# Patient Record
Sex: MALE | Race: BLACK OR AFRICAN AMERICAN | NOT HISPANIC OR LATINO
[De-identification: names, ages, dates, MRNs, and addresses within clinical notes are randomized per-mention and may not be internally consistent; named-entity substitution may affect disease eponyms.]

---

## 2018-01-17 PROBLEM — Z00.00 ENCOUNTER FOR PREVENTIVE HEALTH EXAMINATION: Status: ACTIVE | Noted: 2018-01-17

## 2018-02-27 ENCOUNTER — APPOINTMENT (OUTPATIENT)
Dept: SURGERY | Facility: CLINIC | Age: 58
End: 2018-02-27
Payer: MEDICARE

## 2018-02-27 VITALS
HEIGHT: 70 IN | HEART RATE: 78 BPM | SYSTOLIC BLOOD PRESSURE: 130 MMHG | WEIGHT: 140 LBS | DIASTOLIC BLOOD PRESSURE: 73 MMHG | BODY MASS INDEX: 20.04 KG/M2

## 2018-02-27 PROCEDURE — 99204 OFFICE O/P NEW MOD 45 MIN: CPT

## 2018-02-27 RX ORDER — ASPIRIN 81 MG
81 TABLET, DELAYED RELEASE (ENTERIC COATED) ORAL
Refills: 0 | Status: ACTIVE | COMMUNITY

## 2018-02-27 RX ORDER — LOSARTAN POTASSIUM 100 MG/1
100 TABLET, FILM COATED ORAL
Refills: 0 | Status: ACTIVE | COMMUNITY

## 2018-02-27 RX ORDER — GABAPENTIN 400 MG/1
CAPSULE ORAL
Refills: 0 | Status: ACTIVE | COMMUNITY

## 2018-02-27 RX ORDER — METOPROLOL SUCCINATE 50 MG/1
50 TABLET, EXTENDED RELEASE ORAL
Refills: 0 | Status: ACTIVE | COMMUNITY

## 2018-03-12 ENCOUNTER — FORM ENCOUNTER (OUTPATIENT)
Age: 58
End: 2018-03-12

## 2018-03-13 ENCOUNTER — OUTPATIENT (OUTPATIENT)
Dept: OUTPATIENT SERVICES | Facility: HOSPITAL | Age: 58
LOS: 1 days | End: 2018-03-13
Payer: MEDICAID

## 2018-03-13 ENCOUNTER — APPOINTMENT (OUTPATIENT)
Dept: ULTRASOUND IMAGING | Facility: CLINIC | Age: 58
End: 2018-03-13
Payer: MEDICARE

## 2018-03-13 ENCOUNTER — APPOINTMENT (OUTPATIENT)
Dept: CT IMAGING | Facility: CLINIC | Age: 58
End: 2018-03-13
Payer: MEDICARE

## 2018-03-13 DIAGNOSIS — Z00.8 ENCOUNTER FOR OTHER GENERAL EXAMINATION: ICD-10-CM

## 2018-03-13 PROCEDURE — 70492 CT SFT TSUE NCK W/O & W/DYE: CPT

## 2018-03-13 PROCEDURE — 82565 ASSAY OF CREATININE: CPT

## 2018-03-13 PROCEDURE — 76536 US EXAM OF HEAD AND NECK: CPT

## 2018-03-13 PROCEDURE — 70491 CT SOFT TISSUE NECK W/DYE: CPT | Mod: 26

## 2018-03-13 PROCEDURE — 76536 US EXAM OF HEAD AND NECK: CPT | Mod: 26

## 2018-04-30 ENCOUNTER — OUTPATIENT (OUTPATIENT)
Dept: OUTPATIENT SERVICES | Facility: HOSPITAL | Age: 58
LOS: 1 days | End: 2018-04-30
Payer: MEDICAID

## 2018-04-30 VITALS
HEIGHT: 70 IN | RESPIRATION RATE: 16 BRPM | OXYGEN SATURATION: 98 % | SYSTOLIC BLOOD PRESSURE: 140 MMHG | DIASTOLIC BLOOD PRESSURE: 78 MMHG | WEIGHT: 136.91 LBS | TEMPERATURE: 97 F | HEART RATE: 92 BPM

## 2018-04-30 DIAGNOSIS — Z21 ASYMPTOMATIC HUMAN IMMUNODEFICIENCY VIRUS [HIV] INFECTION STATUS: ICD-10-CM

## 2018-04-30 DIAGNOSIS — Z98.890 OTHER SPECIFIED POSTPROCEDURAL STATES: Chronic | ICD-10-CM

## 2018-04-30 DIAGNOSIS — I10 ESSENTIAL (PRIMARY) HYPERTENSION: ICD-10-CM

## 2018-04-30 DIAGNOSIS — E21.3 HYPERPARATHYROIDISM, UNSPECIFIED: ICD-10-CM

## 2018-04-30 DIAGNOSIS — E21.0 PRIMARY HYPERPARATHYROIDISM: ICD-10-CM

## 2018-04-30 DIAGNOSIS — Z92.21 PERSONAL HISTORY OF ANTINEOPLASTIC CHEMOTHERAPY: Chronic | ICD-10-CM

## 2018-04-30 LAB
BUN SERPL-MCNC: 12 MG/DL — SIGNIFICANT CHANGE UP (ref 7–23)
CALCIUM SERPL-MCNC: 11.2 MG/DL — HIGH (ref 8.4–10.5)
CHLORIDE SERPL-SCNC: 97 MMOL/L — LOW (ref 98–107)
CO2 SERPL-SCNC: 27 MMOL/L — SIGNIFICANT CHANGE UP (ref 22–31)
CREAT SERPL-MCNC: 1.04 MG/DL — SIGNIFICANT CHANGE UP (ref 0.5–1.3)
GLUCOSE SERPL-MCNC: 179 MG/DL — HIGH (ref 70–99)
HBA1C BLD-MCNC: 5.5 % — SIGNIFICANT CHANGE UP (ref 4–5.6)
HCT VFR BLD CALC: 44.3 % — SIGNIFICANT CHANGE UP (ref 39–50)
HGB BLD-MCNC: 15.2 G/DL — SIGNIFICANT CHANGE UP (ref 13–17)
MCHC RBC-ENTMCNC: 31.5 PG — SIGNIFICANT CHANGE UP (ref 27–34)
MCHC RBC-ENTMCNC: 34.3 % — SIGNIFICANT CHANGE UP (ref 32–36)
MCV RBC AUTO: 91.7 FL — SIGNIFICANT CHANGE UP (ref 80–100)
NRBC # FLD: 0 — SIGNIFICANT CHANGE UP
PLATELET # BLD AUTO: 249 K/UL — SIGNIFICANT CHANGE UP (ref 150–400)
PMV BLD: 9.9 FL — SIGNIFICANT CHANGE UP (ref 7–13)
POTASSIUM SERPL-MCNC: 3.4 MMOL/L — LOW (ref 3.5–5.3)
POTASSIUM SERPL-SCNC: 3.4 MMOL/L — LOW (ref 3.5–5.3)
RBC # BLD: 4.83 M/UL — SIGNIFICANT CHANGE UP (ref 4.2–5.8)
RBC # FLD: 13.2 % — SIGNIFICANT CHANGE UP (ref 10.3–14.5)
SODIUM SERPL-SCNC: 138 MMOL/L — SIGNIFICANT CHANGE UP (ref 135–145)
WBC # BLD: 10.73 K/UL — HIGH (ref 3.8–10.5)
WBC # FLD AUTO: 10.73 K/UL — HIGH (ref 3.8–10.5)

## 2018-04-30 PROCEDURE — 93010 ELECTROCARDIOGRAM REPORT: CPT

## 2018-04-30 RX ORDER — METOPROLOL TARTRATE 50 MG
1 TABLET ORAL
Qty: 0 | Refills: 0 | COMMUNITY

## 2018-04-30 RX ORDER — LEVOTHYROXINE SODIUM 125 MCG
1 TABLET ORAL
Qty: 0 | Refills: 0 | COMMUNITY

## 2018-04-30 RX ORDER — ASPIRIN/CALCIUM CARB/MAGNESIUM 324 MG
1 TABLET ORAL
Qty: 0 | Refills: 0 | COMMUNITY

## 2018-04-30 RX ORDER — RANITIDINE HYDROCHLORIDE 150 MG/1
1 TABLET, FILM COATED ORAL
Qty: 0 | Refills: 0 | COMMUNITY

## 2018-04-30 RX ORDER — NIFEDIPINE 30 MG
1 TABLET, EXTENDED RELEASE 24 HR ORAL
Qty: 0 | Refills: 0 | COMMUNITY

## 2018-04-30 RX ORDER — LOSARTAN POTASSIUM 100 MG/1
1 TABLET, FILM COATED ORAL
Qty: 0 | Refills: 0 | COMMUNITY

## 2018-04-30 NOTE — H&P PST ADULT - ASSESSMENT
56 y/o male with hx of elevated calcium level which has been increasing.  Subsequently dx with hyperparathyroidism.  Scheduled for Parathyroidectomy with parathyroid hormone assay 5/14/18.

## 2018-04-30 NOTE — H&P PST ADULT - ALLERGIC/IMMUNOLOGIC COMMENTS
Pt with HIV dx 1992.   Followed by [facility]. Hx of Non Hodgkins Lymphoma 1992, treated with chemo and RT Pt with HIV dx 1992.   Followed by [facility]. Hx of Non Hodgkin's Lymphoma 1992, treated with chemo and RT.  Has been in remission, per pt

## 2018-04-30 NOTE — H&P PST ADULT - NEUROLOGICAL COMMENTS
Denies numbness/ tingling fingers bilateral hands.  Pt report he takes Neurontin for numbness and pain bilateral feet with relief.

## 2018-04-30 NOTE — H&P PST ADULT - ENMT COMMENTS
Hx of radiation tx to anterior neck 1992.  Denies tightness of neck, throat, no difficulty swallowing per pt

## 2018-04-30 NOTE — H&P PST ADULT - PSH
H/O lymph node biopsy  history of multiple biopsies  History of cancer chemotherapy  insertion and removal of mediport 1993

## 2018-04-30 NOTE — H&P PST ADULT - HISTORY OF PRESENT ILLNESS
58 y/o male with hx of elevated calcium level which has been increasing.  Subsequently dx with hyperparathyroidism.  Scheduled for Parathyroidectomy with parathyroid hormone assay 5/14/18.

## 2018-04-30 NOTE — H&P PST ADULT - NEGATIVE NEUROLOGICAL SYMPTOMS
no weakness/no generalized seizures/no loss of consciousness/no difficulty walking/no focal seizures/no syncope/no vertigo/no loss of sensation/no transient paralysis/no headache

## 2018-04-30 NOTE — H&P PST ADULT - PROBLEM SELECTOR PLAN 1
Parathyroidectomy with parathyroid hormone assay 5/14/18.     CBC BMP HgbA1C EKG    Pt reports he was seen by PMD for pre-op eval, requested on chart    Pre-op instructions, antibacterial soap given and explained.

## 2018-05-14 ENCOUNTER — RESULT REVIEW (OUTPATIENT)
Age: 58
End: 2018-05-14

## 2018-05-14 ENCOUNTER — OTHER (OUTPATIENT)
Age: 58
End: 2018-05-14

## 2018-05-14 ENCOUNTER — OUTPATIENT (OUTPATIENT)
Dept: OUTPATIENT SERVICES | Facility: HOSPITAL | Age: 58
LOS: 1 days | Discharge: HOME | End: 2018-05-14
Payer: MEDICAID

## 2018-05-14 ENCOUNTER — APPOINTMENT (OUTPATIENT)
Dept: SURGERY | Facility: HOSPITAL | Age: 58
End: 2018-05-14

## 2018-05-14 VITALS
SYSTOLIC BLOOD PRESSURE: 148 MMHG | WEIGHT: 136.91 LBS | RESPIRATION RATE: 16 BRPM | HEART RATE: 82 BPM | TEMPERATURE: 98 F | HEIGHT: 70 IN | OXYGEN SATURATION: 100 % | DIASTOLIC BLOOD PRESSURE: 76 MMHG

## 2018-05-14 VITALS
SYSTOLIC BLOOD PRESSURE: 127 MMHG | RESPIRATION RATE: 15 BRPM | DIASTOLIC BLOOD PRESSURE: 87 MMHG | HEART RATE: 89 BPM | OXYGEN SATURATION: 100 %

## 2018-05-14 DIAGNOSIS — Z98.890 OTHER SPECIFIED POSTPROCEDURAL STATES: Chronic | ICD-10-CM

## 2018-05-14 DIAGNOSIS — E21.0 PRIMARY HYPERPARATHYROIDISM: ICD-10-CM

## 2018-05-14 DIAGNOSIS — Z92.21 PERSONAL HISTORY OF ANTINEOPLASTIC CHEMOTHERAPY: Chronic | ICD-10-CM

## 2018-05-14 LAB — GLUCOSE BLDC GLUCOMTR-MCNC: 90 MG/DL — SIGNIFICANT CHANGE UP (ref 70–99)

## 2018-05-14 PROCEDURE — 88305 TISSUE EXAM BY PATHOLOGIST: CPT | Mod: 26

## 2018-05-14 PROCEDURE — 88331 PATH CONSLTJ SURG 1 BLK 1SPC: CPT | Mod: 26

## 2018-05-14 PROCEDURE — 60500 EXPLORE PARATHYROID GLANDS: CPT

## 2018-05-14 PROCEDURE — 60500 EXPLORE PARATHYROID GLANDS: CPT | Mod: AS

## 2018-05-14 PROCEDURE — 13132 CMPLX RPR F/C/C/M/N/AX/G/H/F: CPT | Mod: 59

## 2018-05-14 RX ORDER — NIFEDIPINE 30 MG
1 TABLET, EXTENDED RELEASE 24 HR ORAL
Qty: 0 | Refills: 0 | COMMUNITY

## 2018-05-14 RX ORDER — ACETAMINOPHEN 500 MG
650 TABLET ORAL EVERY 6 HOURS
Qty: 0 | Refills: 0 | Status: DISCONTINUED | OUTPATIENT
Start: 2018-05-14 | End: 2018-05-15

## 2018-05-14 RX ORDER — INSULIN LISPRO 100/ML
0 VIAL (ML) SUBCUTANEOUS
Qty: 0 | Refills: 0 | COMMUNITY

## 2018-05-14 RX ORDER — METOPROLOL TARTRATE 50 MG
1 TABLET ORAL
Qty: 0 | Refills: 0 | COMMUNITY

## 2018-05-14 RX ORDER — ACETAMINOPHEN 500 MG
2 TABLET ORAL
Qty: 0 | Refills: 0 | COMMUNITY
Start: 2018-05-14

## 2018-05-14 RX ORDER — CALCIUM CARBONATE 500(1250)
1 TABLET ORAL
Qty: 0 | Refills: 0 | Status: DISCONTINUED | OUTPATIENT
Start: 2018-05-14 | End: 2018-05-15

## 2018-05-14 RX ORDER — EMTRICITABINE, RILPIVIRINE HYDROCHLORIDE, AND TENOFOVIR DISOPROXIL FUMARATE 200; 25; 300 MG/1; MG/1; MG/1
1 TABLET, FILM COATED ORAL
Qty: 0 | Refills: 0 | COMMUNITY

## 2018-05-14 RX ORDER — OXYCODONE AND ACETAMINOPHEN 5; 325 MG/1; MG/1
1 TABLET ORAL EVERY 4 HOURS
Qty: 0 | Refills: 0 | Status: DISCONTINUED | OUTPATIENT
Start: 2018-05-14 | End: 2018-05-15

## 2018-05-14 RX ORDER — GABAPENTIN 400 MG/1
1 CAPSULE ORAL
Qty: 0 | Refills: 0 | COMMUNITY

## 2018-05-14 RX ORDER — RANITIDINE HYDROCHLORIDE 150 MG/1
1 TABLET, FILM COATED ORAL
Qty: 0 | Refills: 0 | COMMUNITY

## 2018-05-14 RX ORDER — LEVOTHYROXINE SODIUM 125 MCG
1 TABLET ORAL
Qty: 0 | Refills: 0 | COMMUNITY

## 2018-05-14 RX ORDER — OMEGA-3 ACID ETHYL ESTERS 1 G
2 CAPSULE ORAL
Qty: 0 | Refills: 0 | COMMUNITY

## 2018-05-14 RX ORDER — CALCIUM CARBONATE 500(1250)
1 TABLET ORAL
Qty: 0 | Refills: 0 | COMMUNITY
Start: 2018-05-14

## 2018-05-14 RX ORDER — ASPIRIN/CALCIUM CARB/MAGNESIUM 324 MG
1 TABLET ORAL
Qty: 0 | Refills: 0 | COMMUNITY

## 2018-05-14 RX ORDER — SODIUM CHLORIDE 9 MG/ML
1000 INJECTION, SOLUTION INTRAVENOUS
Qty: 0 | Refills: 0 | Status: DISCONTINUED | OUTPATIENT
Start: 2018-05-14 | End: 2018-05-15

## 2018-05-14 RX ORDER — LOSARTAN POTASSIUM 100 MG/1
1 TABLET, FILM COATED ORAL
Qty: 0 | Refills: 0 | COMMUNITY

## 2018-05-14 RX ADMIN — Medication 1 TABLET(S): at 17:06

## 2018-05-14 RX ADMIN — SODIUM CHLORIDE 50 MILLILITER(S): 9 INJECTION, SOLUTION INTRAVENOUS at 16:45

## 2018-05-14 NOTE — ASU DISCHARGE PLAN (ADULT/PEDIATRIC). - NURSING INSTRUCTIONS
See medication reconciliation record  You were given an antibiotic ( Clindamycin 600mg ) in OR today about 1:45pm  You were given IV Tylenol for pain management.  Please DO NOT take tylenol for the next 6-8 hours (after 7:45pm ). Please do not exceed 3000mg in 24hours.

## 2018-05-14 NOTE — ASU DISCHARGE PLAN (ADULT/PEDIATRIC). - ITEMS TO FOLLOWUP WITH YOUR PHYSICIAN'S
Call MD for any neck swelling, any shortness of breath, or any redness/drainage from wound. Stay away from hot, spicy and jagged edged foods.  Call MD for any nasal tip, fingertip or extremity numbness/tingling. Take medications as directed.     After showering pat dry steri strips.  Do Not rub them.  They will curl up and fall off by themselves within 7 days.

## 2018-05-14 NOTE — ASU DISCHARGE PLAN (ADULT/PEDIATRIC). - MEDICATION SUMMARY - MEDICATIONS TO TAKE
I will START or STAY ON the medications listed below when I get home from the hospital:    acetaminophen 325 mg oral tablet  -- 2 tab(s) by mouth every 6 hours, As needed, Moderate Pain (4 - 6)  -- Indication: For Primary hyperparathyroidism    Aspir 81 oral delayed release tablet  -- 1 tab(s) by mouth once a day (at bedtime)  -- Indication: For Primary hyperparathyroidism    Cozaar 100 mg oral tablet  -- 1 tab(s) by mouth once a day with dinner  -- Indication: For Primary hyperparathyroidism    calcium carbonate 500 mg (200 mg elemental calcium) oral tablet, chewable  -- 1 tab(s) by mouth 4 times a day  -- Indication: For Primary hyperparathyroidism    Neurontin 800 mg oral tablet  -- 1 tab(s) by mouth 2 times a day  -- Indication: For Primary hyperparathyroidism    HumaLOG 100 units/mL injectable solution  -- 78 units before breakfast,   42 units beforer lunch,   70 units before dinner  -- Indication: For Primary hyperparathyroidism    Complera oral tablet  -- 1 tab(s) by mouth once a day with dinner  -- Indication: For Primary hyperparathyroidism    Toprol- mg oral tablet, extended release  -- 1 tab(s) by mouth once a day with dinner  -- Indication: For Primary hyperparathyroidism    Procardia XL 60 mg oral tablet, extended release  -- 1 tab(s) by mouth once a day with dinner  -- Indication: For Primary hyperparathyroidism    Zantac 300 oral tablet  -- 1 tab(s) by mouth once a day, As Needed  -- Indication: For Primary hyperparathyroidism    Fish Oil 1000 mg oral capsule  -- 2 cap(s) by mouth 2 times a day last dose 5/6/2018  -- Indication: For Primary hyperparathyroidism    Synthroid 125 mcg (0.125 mg) oral tablet  -- 1 tab(s) by mouth once a day am  -- Indication: For Primary hyperparathyroidism

## 2018-05-14 NOTE — BRIEF OPERATIVE NOTE - PROCEDURE
<<-----Click on this checkbox to enter Procedure Parathyroid hormone (PTH) antibody assay  05/14/2018    Active  KTORTORELL  Parathyroidectomy  05/14/2018    Active  KTORTORELL

## 2018-05-14 NOTE — ASU DISCHARGE PLAN (ADULT/PEDIATRIC). - NOTIFY
Persistent Nausea and Vomiting/Bleeding that does not stop/Pain not relieved by Medications/Swelling that continues/Fever greater than 101/Numbness, tingling/Unable to Urinate

## 2018-05-14 NOTE — ASU DISCHARGE PLAN (ADULT/PEDIATRIC). - COMMENTS
Surgical Unit will call you on the next business day to follow up. [facility] is open Monday - Friday.

## 2018-05-16 LAB — SURGICAL PATHOLOGY STUDY: SIGNIFICANT CHANGE UP

## 2018-05-29 ENCOUNTER — APPOINTMENT (OUTPATIENT)
Dept: SURGERY | Facility: CLINIC | Age: 58
End: 2018-05-29
Payer: MEDICARE

## 2018-05-29 PROCEDURE — 99024 POSTOP FOLLOW-UP VISIT: CPT

## 2018-05-29 PROCEDURE — 36415 COLL VENOUS BLD VENIPUNCTURE: CPT

## 2018-05-30 ENCOUNTER — RESULT REVIEW (OUTPATIENT)
Age: 58
End: 2018-05-30

## 2018-05-30 LAB
CALCIUM SERPL-MCNC: 9.9 MG/DL
CALCIUM SERPL-MCNC: 9.9 MG/DL
PARATHYROID HORMONE INTACT: 25 PG/ML

## 2018-05-31 ENCOUNTER — RESULT REVIEW (OUTPATIENT)
Age: 58
End: 2018-05-31

## 2018-05-31 LAB — 25(OH)D3 SERPL-MCNC: 32 NG/ML

## 2018-06-04 ENCOUNTER — RESULT REVIEW (OUTPATIENT)
Age: 58
End: 2018-06-04

## 2018-08-30 ENCOUNTER — APPOINTMENT (OUTPATIENT)
Dept: SURGERY | Facility: CLINIC | Age: 58
End: 2018-08-30
Payer: MEDICARE

## 2018-08-30 PROBLEM — K21.9 GASTRO-ESOPHAGEAL REFLUX DISEASE WITHOUT ESOPHAGITIS: Chronic | Status: ACTIVE | Noted: 2018-04-30

## 2018-08-30 PROBLEM — N28.1 CYST OF KIDNEY, ACQUIRED: Chronic | Status: ACTIVE | Noted: 2018-04-30

## 2018-08-30 PROBLEM — I10 ESSENTIAL (PRIMARY) HYPERTENSION: Chronic | Status: ACTIVE | Noted: 2018-04-30

## 2018-08-30 PROBLEM — I82.409 ACUTE EMBOLISM AND THROMBOSIS OF UNSPECIFIED DEEP VEINS OF UNSPECIFIED LOWER EXTREMITY: Chronic | Status: ACTIVE | Noted: 2018-04-30

## 2018-08-30 PROBLEM — E21.3 HYPERPARATHYROIDISM, UNSPECIFIED: Chronic | Status: ACTIVE | Noted: 2018-04-30

## 2018-08-30 PROBLEM — C85.90 NON-HODGKIN LYMPHOMA, UNSPECIFIED, UNSPECIFIED SITE: Chronic | Status: ACTIVE | Noted: 2018-04-30

## 2018-08-30 PROBLEM — Z21 ASYMPTOMATIC HUMAN IMMUNODEFICIENCY VIRUS [HIV] INFECTION STATUS: Chronic | Status: ACTIVE | Noted: 2018-04-30

## 2018-08-30 PROBLEM — E03.9 HYPOTHYROIDISM, UNSPECIFIED: Chronic | Status: ACTIVE | Noted: 2018-04-30

## 2018-08-30 PROCEDURE — 36415 COLL VENOUS BLD VENIPUNCTURE: CPT

## 2018-08-30 PROCEDURE — 99213 OFFICE O/P EST LOW 20 MIN: CPT

## 2018-08-31 ENCOUNTER — RESULT REVIEW (OUTPATIENT)
Age: 58
End: 2018-08-31

## 2018-08-31 LAB
25(OH)D3 SERPL-MCNC: 32.8 NG/ML
CALCIUM SERPL-MCNC: 9.7 MG/DL
CALCIUM SERPL-MCNC: 9.7 MG/DL
PARATHYROID HORMONE INTACT: 47 PG/ML

## 2018-09-04 ENCOUNTER — RESULT REVIEW (OUTPATIENT)
Age: 58
End: 2018-09-04

## 2018-12-01 ENCOUNTER — OUTPATIENT (OUTPATIENT)
Dept: OUTPATIENT SERVICES | Facility: HOSPITAL | Age: 58
LOS: 1 days | End: 2018-12-01
Payer: MEDICAID

## 2018-12-01 DIAGNOSIS — Z98.890 OTHER SPECIFIED POSTPROCEDURAL STATES: Chronic | ICD-10-CM

## 2018-12-01 DIAGNOSIS — Z92.21 PERSONAL HISTORY OF ANTINEOPLASTIC CHEMOTHERAPY: Chronic | ICD-10-CM

## 2018-12-01 PROCEDURE — G9001: CPT

## 2018-12-12 DIAGNOSIS — Z71.89 OTHER SPECIFIED COUNSELING: ICD-10-CM

## 2019-02-26 ENCOUNTER — APPOINTMENT (OUTPATIENT)
Dept: SURGERY | Facility: CLINIC | Age: 59
End: 2019-02-26
Payer: MEDICARE

## 2019-02-26 DIAGNOSIS — E21.3 HYPERPARATHYROIDISM, UNSPECIFIED: ICD-10-CM

## 2019-02-26 PROCEDURE — 99213 OFFICE O/P EST LOW 20 MIN: CPT

## 2019-02-26 PROCEDURE — 36415 COLL VENOUS BLD VENIPUNCTURE: CPT

## 2019-02-26 NOTE — PHYSICAL EXAM
[de-identified] : well healed scar [Midline] : located in midline position [Normal] : orientation to person, place, and time: normal [de-identified] : Neg [place]'s sign

## 2019-02-27 ENCOUNTER — RESULT REVIEW (OUTPATIENT)
Age: 59
End: 2019-02-27

## 2019-02-27 LAB
25(OH)D3 SERPL-MCNC: 32 NG/ML
CALCIUM SERPL-MCNC: 9.7 MG/DL
CALCIUM SERPL-MCNC: 9.7 MG/DL
PARATHYROID HORMONE INTACT: 36 PG/ML

## 2019-02-28 ENCOUNTER — RESULT REVIEW (OUTPATIENT)
Age: 59
End: 2019-02-28

## 2020-09-17 NOTE — H&P PST ADULT - PMH
Include Z78.9 (Other Specified Conditions Influencing Health Status) As An Associated Diagnosis?: No
Total Number Of Lesions Treated: 2
Detail Level: Zone
Medical Necessity Clause: This procedure was medically necessary because the lesions that were treated were:
Medical Necessity Information: It is in your best interest to select a reason for this procedure from the list below. All of these items fulfill various CMS LCD requirements except the new and changing color options.
DVT (deep venous thrombosis)  1990  GERD (gastroesophageal reflux disease)    HIV antibody positive    Hyperparathyroidism    Hypertension    Hypothyroidism    Kidney cysts    Non Hodgkin's lymphoma  1992

## 2023-02-22 ENCOUNTER — LABORATORY RESULT (OUTPATIENT)
Age: 63
End: 2023-02-22

## 2023-02-22 ENCOUNTER — OUTPATIENT (OUTPATIENT)
Dept: OUTPATIENT SERVICES | Facility: HOSPITAL | Age: 63
LOS: 1 days | Discharge: HOME | End: 2023-02-22

## 2023-02-22 ENCOUNTER — APPOINTMENT (OUTPATIENT)
Dept: RADIATION ONCOLOGY | Facility: CLINIC | Age: 63
End: 2023-02-22
Payer: MEDICAID

## 2023-02-22 VITALS
SYSTOLIC BLOOD PRESSURE: 162 MMHG | WEIGHT: 138.23 LBS | DIASTOLIC BLOOD PRESSURE: 92 MMHG | HEIGHT: 70 IN | OXYGEN SATURATION: 97 % | BODY MASS INDEX: 19.79 KG/M2 | HEART RATE: 111 BPM | RESPIRATION RATE: 17 BRPM | TEMPERATURE: 97.16 F

## 2023-02-22 DIAGNOSIS — Z98.890 OTHER SPECIFIED POSTPROCEDURAL STATES: Chronic | ICD-10-CM

## 2023-02-22 DIAGNOSIS — Z92.21 PERSONAL HISTORY OF ANTINEOPLASTIC CHEMOTHERAPY: Chronic | ICD-10-CM

## 2023-02-22 PROCEDURE — 99205 OFFICE O/P NEW HI 60 MIN: CPT | Mod: 25

## 2023-02-22 RX ORDER — NIFEDIPINE 90 MG/1
90 TABLET, EXTENDED RELEASE ORAL
Qty: 30 | Refills: 0 | Status: ACTIVE | COMMUNITY
Start: 2023-01-23

## 2023-02-22 RX ORDER — LEVOTHYROXINE SODIUM 88 UG/1
88 TABLET ORAL DAILY
Refills: 0 | Status: ACTIVE | COMMUNITY

## 2023-02-22 RX ORDER — ADHESIVE TAPE 3"X 2.3 YD
50 MCG TAPE, NON-MEDICATED TOPICAL DAILY
Refills: 0 | Status: DISCONTINUED | COMMUNITY
Start: 2023-02-22 | End: 2023-02-22

## 2023-02-22 RX ORDER — NIFEDIPINE 90 MG/1
90 TABLET, FILM COATED, EXTENDED RELEASE ORAL DAILY
Refills: 0 | Status: ACTIVE | COMMUNITY

## 2023-02-22 RX ORDER — AZITHROMYCIN 500 MG/1
500 TABLET, FILM COATED ORAL
Qty: 6 | Refills: 0 | Status: ACTIVE | COMMUNITY
Start: 2022-09-01

## 2023-02-22 RX ORDER — EMTRICITABINE, RILPIVIRINE HYDROCHLORIDE, AND TENOFOVIR DISOPROXIL FUMARATE 200; 25; 300 MG/1; MG/1; MG/1
TABLET, FILM COATED ORAL
Refills: 0 | Status: DISCONTINUED | COMMUNITY
End: 2023-02-22

## 2023-02-22 RX ORDER — ALENDRONATE SODIUM 70 MG/1
70 TABLET ORAL
Qty: 4 | Refills: 0 | Status: ACTIVE | COMMUNITY
Start: 2023-02-05

## 2023-02-22 RX ORDER — LEVOTHYROXINE SODIUM 0.09 MG/1
88 TABLET ORAL
Qty: 30 | Refills: 0 | Status: ACTIVE | COMMUNITY
Start: 2023-01-23

## 2023-02-22 RX ORDER — GABAPENTIN 800 MG/1
800 TABLET, COATED ORAL
Qty: 60 | Refills: 0 | Status: ACTIVE | COMMUNITY
Start: 2022-04-05

## 2023-02-22 RX ORDER — BICTEGRAVIR SODIUM, EMTRICITABINE, AND TENOFOVIR ALAFENAMIDE FUMARATE 50; 200; 25 MG/1; MG/1; MG/1
50-200-25 TABLET ORAL
Qty: 30 | Refills: 0 | Status: ACTIVE | COMMUNITY
Start: 2022-09-01

## 2023-02-22 RX ORDER — METOPROLOL SUCCINATE 100 MG/1
100 TABLET, EXTENDED RELEASE ORAL
Qty: 30 | Refills: 0 | Status: ACTIVE | COMMUNITY
Start: 2023-01-30

## 2023-02-22 RX ORDER — PRAVASTATIN SODIUM 20 MG/1
20 TABLET ORAL
Qty: 90 | Refills: 0 | Status: ACTIVE | COMMUNITY
Start: 2023-01-13

## 2023-02-22 RX ORDER — INSULIN GLARGINE 100 [IU]/ML
100 INJECTION, SOLUTION SUBCUTANEOUS
Qty: 15 | Refills: 0 | Status: ACTIVE | COMMUNITY
Start: 2022-10-20

## 2023-02-22 RX ORDER — SULFAMETHOXAZOLE AND TRIMETHOPRIM 800; 160 MG/1; MG/1
800-160 TABLET ORAL
Qty: 4 | Refills: 0 | Status: DISCONTINUED | COMMUNITY
Start: 2022-09-26

## 2023-02-22 RX ORDER — BLOOD SUGAR DIAGNOSTIC
STRIP MISCELLANEOUS
Qty: 100 | Refills: 0 | Status: ACTIVE | COMMUNITY
Start: 2022-04-05

## 2023-02-22 RX ORDER — BISACODYL 5 MG/1
5 TABLET ORAL
Qty: 4 | Refills: 0 | Status: DISCONTINUED | COMMUNITY
Start: 2023-02-17

## 2023-02-22 RX ORDER — LATANOPROST/PF 0.005 %
0.01 DROPS OPHTHALMIC (EYE)
Qty: 2 | Refills: 0 | Status: ACTIVE | COMMUNITY
Start: 2023-02-10

## 2023-02-22 RX ORDER — ACETAMINOPHEN 650 MG/1
650 TABLET, EXTENDED RELEASE ORAL
Qty: 16 | Refills: 0 | Status: DISCONTINUED | COMMUNITY
Start: 2022-09-26

## 2023-02-22 RX ORDER — INSULIN LISPRO 100 [IU]/ML
INJECTION, SOLUTION INTRAVENOUS; SUBCUTANEOUS
Refills: 0 | Status: DISCONTINUED | COMMUNITY
End: 2023-02-22

## 2023-02-22 RX ORDER — PEN NEEDLE, DIABETIC 32GX 5/32"
32G X 4 MM NEEDLE, DISPOSABLE MISCELLANEOUS
Qty: 100 | Refills: 0 | Status: ACTIVE | COMMUNITY
Start: 2022-12-09

## 2023-02-22 RX ORDER — TAMSULOSIN HYDROCHLORIDE 0.4 MG/1
0.4 CAPSULE ORAL
Qty: 30 | Refills: 0 | Status: ACTIVE | COMMUNITY
Start: 2023-01-23

## 2023-02-22 NOTE — VITALS
[Maximal Pain Intensity: 0/10] : 0/10 [Least Pain Intensity: 0/10] : 0/10 [90: Able to carry normal activity; minor signs or symptoms of disease.] : 90: Able to carry normal activity; minor signs or symptoms of disease.  [2 - Distress Level] : Distress Level: 2 [ECOG Performance Status: 1 - Restricted in physically strenuous activity but ambulatory and able to carry out work of a light or sedentary nature] : Performance Status: 1 - Restricted in physically strenuous activity but ambulatory and able to carry out work of a light or sedentary nature, e.g., light house work, office work

## 2023-02-22 NOTE — PHYSICAL EXAM
[General Appearance - Alert] : alert [General Appearance - In No Acute Distress] : in no acute distress [Sclera] : the sclera and conjunctiva were normal [Extraocular Movements] : extraocular movements were intact [Outer Ear] : the ears and nose were normal in appearance [Hearing Threshold Finger Rub Not [place]] : hearing was normal [Respiration, Rhythm And Depth] : normal respiratory rhythm and effort [Exaggerated Use Of Accessory Muscles For Inspiration] : no accessory muscle use [Abdomen Soft] : soft [Nondistended] : nondistended [Nail Clubbing] : no clubbing  or cyanosis of the fingernails [Range of Motion to Joints] : range of motion to joints [Skin Color & Pigmentation] : normal skin color and pigmentation [] : no rash [No Focal Deficits] : no focal deficits [Motor Exam] : the motor exam was normal [Oriented To Time, Place, And Person] : oriented to person, place, and time [Affect] : the affect was normal [Thin] : thin [Arterial Pulses Normal] : the arterial pulses were normal [Edema] : no peripheral edema present

## 2023-02-22 NOTE — REVIEW OF SYSTEMS
[Fatigue] : fatigue [Dysphagia] : dysphagia [Cough] : cough [Nocturia] : nocturia [Urinary Frequency] : urinary frequency [Anxiety] : anxiety [Negative] : Integumentary [Fever] : no fever [Chills] : no chills [Night Sweats] : no night sweats [Recent Change In Weight] : ~T no recent weight change [Eye Pain] : no eye pain [Visual Disturbances] : no visual disturbances [Hoarseness] : no hoarseness [Chest Pain] : no chest pain [Palpitations] : no palpitations [Shortness Of Breath] : no shortness of breath [Joint Pain] : no joint pain [Disturbance Of Gait] : no gait disturbance [Confused] : no confusion [Difficulty Walking] : no difficulty walking [Insomnia] : no insomnia [Easy Bleeding] : no tendency for easy bleeding [Easy Bruising] : no tendency for easy bruising [FreeTextEntry4] : as noted in HPI

## 2023-02-22 NOTE — HISTORY OF PRESENT ILLNESS
[FreeTextEntry1] : 62M w/ very high risk prostate cancer, G10(5+5), and HIV on HAART.\par \par PMH/PSH: parathyroidectomy, DM, lymphoma, HIV+, HTN/Hchol, BPH\par - of note, previously received radiation therapy and chemotherapy for NHL( R neck) 1992\par \par Social Hx: Lives with mother\par Occupation: -not currently employed\par \par 9/2022 PSA 5.78 \par 2/9/23 10.22\par \par 9/26/22 Biopsy of prostate  showed G10(5+5) disease in left mid (1/1, 85%), left apex (2/2, 75-85%); G9(5+4) disease in the left base (1/1, 50%), left mid (1/1, 85%) on outside pathology (not reviewed). \par \par 10/24/2022 CT A/P showed enlarging perirectal/presacral nodes, but no pelvic sidewall or paraaortic nodes per report. \par \par 10/27/2022 Bone scan showed equivocal activity in cervical spine per report. \par \par Seen by Dr. Alvarado on 2/9/23, additional workup planned. \par - CT chest 2/23/23 and MR prostate 2/24/23\par  \par Seen for evaluation 2/22/23: \par Notes baseline frequency, good stream, nocturia 2-3x.  Denies leakage, dysuria, hematuria, bowel symptoms. Not sexually active without issue. Of note, reports cough and intermittent dysphagia with eating. Had an incomplete colonoscopy 6/2022 and needs to reschedule. \par IPSS/QOL Score 3/1( EPIC incomplete).

## 2023-02-23 LAB
25(OH)D3 SERPL-MCNC: 43.5 NG/ML
PSA SERPL-MCNC: 18.2 NG/ML
TESTOST SERPL-MCNC: 647 NG/DL

## 2023-03-10 ENCOUNTER — RESULT REVIEW (OUTPATIENT)
Age: 63
End: 2023-03-10

## 2023-03-27 ENCOUNTER — APPOINTMENT (OUTPATIENT)
Dept: NUCLEAR MEDICINE | Facility: IMAGING CENTER | Age: 63
End: 2023-03-27
Payer: MEDICAID

## 2023-03-27 ENCOUNTER — OUTPATIENT (OUTPATIENT)
Dept: OUTPATIENT SERVICES | Facility: HOSPITAL | Age: 63
LOS: 1 days | End: 2023-03-27
Payer: MEDICAID

## 2023-03-27 DIAGNOSIS — C61 MALIGNANT NEOPLASM OF PROSTATE: ICD-10-CM

## 2023-03-27 DIAGNOSIS — Z92.21 PERSONAL HISTORY OF ANTINEOPLASTIC CHEMOTHERAPY: Chronic | ICD-10-CM

## 2023-03-27 DIAGNOSIS — Z98.890 OTHER SPECIFIED POSTPROCEDURAL STATES: Chronic | ICD-10-CM

## 2023-03-27 PROCEDURE — 78816 PET IMAGE W/CT FULL BODY: CPT | Mod: 26

## 2023-03-27 PROCEDURE — 78816 PET IMAGE W/CT FULL BODY: CPT

## 2023-03-27 PROCEDURE — A9595: CPT

## 2023-05-19 ENCOUNTER — APPOINTMENT (OUTPATIENT)
Dept: RADIATION ONCOLOGY | Facility: CLINIC | Age: 63
End: 2023-05-19
Payer: MEDICAID

## 2023-05-19 PROCEDURE — 99024 POSTOP FOLLOW-UP VISIT: CPT

## 2023-05-19 NOTE — PHYSICAL EXAM
[General Appearance - Alert] : alert [General Appearance - In No Acute Distress] : in no acute distress [Hearing Threshold Finger Rub Not [place]] : hearing was normal [] : no respiratory distress [Respiration, Rhythm And Depth] : normal respiratory rhythm and effort [Affect] : the affect was normal [Oriented To Time, Place, And Person] : oriented to person, place, and time [Not Anxious] : not anxious [FreeTextEntry1] : limited due to televisit

## 2023-05-19 NOTE — HISTORY OF PRESENT ILLNESS
[Home] : at home, [unfilled] , at the time of the visit. [Medical Office: ([facility])___] : at the medical office located in  [Verbal consent obtained from patient] : the patient, [unfilled] [FreeTextEntry1] : 62M w/ very high risk prostate cancer, rcT3b G9(4+5) N1, and HIV on HAART.\par \par PMH/PSH: parathyroidectomy, DM, lymphoma, HIV+, HTN/Hchol, BPH\par - of note, previously received radiation therapy and chemotherapy for NHL ( R neck) 1992\par \par Social Hx: Lives with mother\par Occupation: -not currently employed\par \par 9/2022 PSA 5.78 \par 2/9/23 10.22\par \par 9/26/22 Biopsy of prostate  showed G10(5+5) disease in left mid (1/1, 85%), left apex (2/2, 75-85%); G9(5+4) disease in the left base (1/1, 50%), left mid (1/1, 85%) on outside pathology; on review at [facility], G9(4+5) disease seen in left apex and left mid sites; G8(4+4) disease in left base. \par \par - 10/24/2022 CT A/P showed enlarging perirectal/presacral nodes, but no pelvic sidewall or paraaortic nodes per report. \par - 10/27/2022 Bone scan showed equivocal activity in cervical spine per report. \par \par Seen by Dr. Alvarado on 2/9/23, additional workup planned. \par - CT chest 2/23/23 and MR prostate 2/24/23 per report, not available for review. \par  \par Seen for evaluation 2/22/23: \par Notes baseline frequency, good stream, nocturia 2-3x.  Denies leakage, dysuria, hematuria, bowel symptoms. Not sexually active without issue. Of note, reports cough and intermittent dysphagia with eating. Had an incomplete colonoscopy 6/2022 and needs to reschedule. \par IPSS/QOL Score 3/1 (EPIC incomplete).\par \par PET CT 3/29/23 had shown avidity throughout prostate and seminal vesicles, as well as avid nodes in the pelvis and retroperitoneum; equivocal mediastina/hilar nodes. \par \par Followup evaluation 5/19/23:\par Started ADT 5/1/23. Hasn't noticed any changes in urination since starting ADT, and has not yet experienced fatigue or hot flashes. Doesn't know about any recent labs. Next visit 6/1/23 with medical oncology. Otherwise feels about the same.

## 2023-08-16 ENCOUNTER — APPOINTMENT (OUTPATIENT)
Dept: RADIATION ONCOLOGY | Facility: CLINIC | Age: 63
End: 2023-08-16
Payer: MEDICAID

## 2023-08-16 ENCOUNTER — OUTPATIENT (OUTPATIENT)
Dept: OUTPATIENT SERVICES | Facility: HOSPITAL | Age: 63
LOS: 1 days | Discharge: HOME | End: 2023-08-16
Payer: MEDICAID

## 2023-08-16 VITALS
DIASTOLIC BLOOD PRESSURE: 74 MMHG | HEART RATE: 94 BPM | SYSTOLIC BLOOD PRESSURE: 129 MMHG | OXYGEN SATURATION: 97 % | RESPIRATION RATE: 16 BRPM

## 2023-08-16 DIAGNOSIS — Z85.79 PERSONAL HISTORY OF OTHER MALIGNANT NEOPLASMS OF LYMPHOID, HEMATOPOIETIC AND RELATED TISSUES: ICD-10-CM

## 2023-08-16 DIAGNOSIS — Z98.890 OTHER SPECIFIED POSTPROCEDURAL STATES: Chronic | ICD-10-CM

## 2023-08-16 DIAGNOSIS — Z92.21 PERSONAL HISTORY OF ANTINEOPLASTIC CHEMOTHERAPY: Chronic | ICD-10-CM

## 2023-08-16 DIAGNOSIS — Z86.39 PERSONAL HISTORY OF OTHER ENDOCRINE, NUTRITIONAL AND METABOLIC DISEASE: ICD-10-CM

## 2023-08-16 DIAGNOSIS — Z92.21 PERSONAL HISTORY OF ANTINEOPLASTIC CHEMOTHERAPY: ICD-10-CM

## 2023-08-16 DIAGNOSIS — Z92.3 PERSONAL HISTORY OF IRRADIATION: ICD-10-CM

## 2023-08-16 DIAGNOSIS — Z86.79 PERSONAL HISTORY OF OTHER DISEASES OF THE CIRCULATORY SYSTEM: ICD-10-CM

## 2023-08-16 DIAGNOSIS — Z21 ASYMPTOMATIC HUMAN IMMUNODEFICIENCY VIRUS [HIV] INFECTION STATUS: ICD-10-CM

## 2023-08-16 PROCEDURE — 99214 OFFICE O/P EST MOD 30 MIN: CPT

## 2023-08-19 PROBLEM — Z85.79 HISTORY OF LYMPHOMA: Status: RESOLVED | Noted: 2018-02-27 | Resolved: 2023-08-19

## 2023-08-19 PROBLEM — Z92.21 HISTORY OF CHEMOTHERAPY: Status: RESOLVED | Noted: 2023-02-22 | Resolved: 2023-08-19

## 2023-08-19 PROBLEM — Z21 HIV POSITIVE: Status: RESOLVED | Noted: 2018-02-27 | Resolved: 2023-08-19

## 2023-08-19 PROBLEM — Z86.79 HISTORY OF HYPERTENSION: Status: RESOLVED | Noted: 2018-02-27 | Resolved: 2023-08-19

## 2023-08-19 PROBLEM — Z86.39 HISTORY OF HYPERLIPIDEMIA: Status: RESOLVED | Noted: 2018-02-27 | Resolved: 2023-08-19

## 2023-08-19 PROBLEM — Z86.39 HISTORY OF DIABETES MELLITUS: Status: RESOLVED | Noted: 2018-02-27 | Resolved: 2023-08-19

## 2023-08-19 NOTE — HISTORY OF PRESENT ILLNESS
[Home] : at home, [unfilled] , at the time of the visit. [Medical Office: ([facility])___] : at the medical office located in  [Verbal consent obtained from patient] : the patient, [unfilled] [FreeTextEntry1] : This 62 year-old male is being seen for routine follow-up.  Mr. Altamirano was diagnosed with very high risk prostate cancer, T3b G9(4+5) N1, and HIV on HAART.    PMH/PSH: parathyroidectomy, DM, lymphoma, HIV+, HTN/HLD, BPH.  Of note, previously received radiation therapy and chemotherapy for NHL (Right neck) in 1992.  PSA trend: 9/2022 = 5.78 ng/ml 2/9/23 = 10.22 ng/ml  9/26/22 Biopsy of prostate showed G10(5+5) disease in left mid (1/1, 85%), left apex (2/2, 75-85%); G9(5+4) disease in the left base (1/1, 50%), left mid (1/1, 85%) on outside pathology; on review at [facility], G9(4+5) disease seen in left apex and left mid sites; G8(4+4) disease in left base.   10/24/2022 CT A/P showed enlarging perirectal/presacral nodes, but no pelvic sidewall or paraaortic nodes per report.   10/27/2022 Bone scan -- equivocal activity in cervical spine per report.   Notes baseline frequency, good stream, nocturia 2-3x.  Denies leakage, dysuria, hematuria, bowel symptoms. Not sexually active without issue. Of note, reports cough and intermittent dysphagia with eating. Had an incomplete colonoscopy 6/2022 and needs to reschedule.  IPSS/QOL Score 3/1 (EPIC incomplete).  PET CT 3/29/23 had shown avidity throughout prostate and seminal vesicles, as well as avid nodes in the pelvis and retroperitoneum; equivocal mediastina/hilar nodes.   Started ADT 5/1/23 per Dr. Granados.  Hasn't noticed any changes in urination, denies fatigue or hot flashes.

## 2023-08-19 NOTE — PHYSICAL EXAM
[Normal] : normoactive bowel sounds, soft and nontender, no hepatosplenomegaly or masses appreciated [de-identified] : fibrotic (probable radiation therapy changes)

## 2023-08-19 NOTE — PHYSICAL EXAM
[Normal] : normoactive bowel sounds, soft and nontender, no hepatosplenomegaly or masses appreciated [de-identified] : fibrotic (probable radiation therapy changes)

## 2023-08-19 NOTE — VITALS
[Maximal Pain Intensity: 0/10] : 0/10 [Least Pain Intensity: 0/10] : 0/10 [90: Able to carry normal activity; minor signs or symptoms of disease.] : 90: Able to carry normal activity; minor signs or symptoms of disease.  [ECOG Performance Status: 1 - Restricted in physically strenuous activity but ambulatory and able to carry out work of a light or sedentary nature] : Performance Status: 1 - Restricted in physically strenuous activity but ambulatory and able to carry out work of a light or sedentary nature, e.g., light house work, office work [80: Normal activity with effort; some signs or symptoms of disease.] : 80: Normal activity with effort; some signs or symptoms of disease.

## 2023-08-19 NOTE — REVIEW OF SYSTEMS
[IPSS Score (0-40): ___] : IPSS score: [unfilled] [EPIC-CP Score (0-60): ___] : EPIC-CP score: [unfilled] [Negative] : Gastrointestinal [FreeTextEntry5] : hypertension, hyperlipidemia [FreeTextEntry8] : prostate cancer, BPH [de-identified] : Maintained on monthly ADT, diabetic, parathyroidectomy [de-identified] : HIV+ on HAART, Hx lymphoma

## 2023-08-19 NOTE — HISTORY OF PRESENT ILLNESS
[Home] : at home, [unfilled] , at the time of the visit. [Medical Office: ([facility])___] : at the medical office located in  [Verbal consent obtained from patient] : the patient, [unfilled] [FreeTextEntry1] : This 62 year-old male is being seen for routine follow-up.  Mr. Altamirano was diagnosed with very high risk prostate cancer, T3b G9(4+5) N1, and HIV on HAART.    PMH/PSH: parathyroidectomy, DM, lymphoma, HIV+, HTN/HLD, BPH.  Of note, previously received radiation therapy and chemotherapy for NHL (Right neck) in 1992.  PSA trend: 9/2022 = 5.78 ng/ml 2/9/23 = 10.22 ng/ml  9/26/22 Biopsy of prostate showed G10(5+5) disease in left mid (1/1, 85%), left apex (2/2, 75-85%); G9(5+4) disease in the left base (1/1, 50%), left mid (1/1, 85%) on outside pathology; on review at [facility], G9(4+5) disease seen in left apex and left mid sites; G8(4+4) disease in left base.   10/24/2022 CT A/P showed enlarging perirectal/presacral nodes, but no pelvic sidewall or paraaortic nodes per report.   10/27/2022 Bone scan -- equivocal activity in cervical spine per report.   Notes baseline frequency, good stream, nocturia 2-3x.  Denies leakage, dysuria, hematuria, bowel symptoms. Not sexually active without issue. Of note, reports cough and intermittent dysphagia with eating. Had an incomplete colonoscopy 6/2022 and needs to reschedule.  IPSS/QOL Score 3/1 (EPIC incomplete).  PET CT 3/29/23 had shown avidity throughout prostate and seminal vesicles, as well as avid nodes in the pelvis and retroperitoneum; equivocal mediastina/hilar nodes.   Started ADT 5/1/23 per Dr. Granados.  Hasn't noticed any changes in urination, denies fatigue or hot flashes.

## 2023-08-19 NOTE — DISEASE MANAGEMENT
[IIIC] : IIIC [3] : T3 [b] : b [1] : N1 [0] : M0 [10-20] : 10 - 20 ng/mL [Biopsy] : Patient had a biopsy on [10] : Template Biopsy Yaquelin Score: 10 [] : Patient had a Prostate MRI [5] : 5 [Extracapsular Extension] : Extracapsular extension [Lymph Node(s)] : Lymph Node(s) [KEM] : KEM [Radiation Therapy] : Radiation Therapy [Androgen Ablation] : Androgen Ablation [BiopsyDate] : 09/22

## 2023-08-19 NOTE — REVIEW OF SYSTEMS
[IPSS Score (0-40): ___] : IPSS score: [unfilled] [EPIC-CP Score (0-60): ___] : EPIC-CP score: [unfilled] [Negative] : Gastrointestinal [FreeTextEntry5] : hypertension, hyperlipidemia [de-identified] : Maintained on monthly ADT, diabetic, parathyroidectomy [FreeTextEntry8] : prostate cancer, BPH [de-identified] : HIV+ on HAART, Hx lymphoma

## 2023-08-30 RX ORDER — CIPROFLOXACIN HYDROCHLORIDE 500 MG/1
500 TABLET, FILM COATED ORAL
Qty: 10 | Refills: 0 | Status: ACTIVE | COMMUNITY
Start: 2023-08-30 | End: 1900-01-01

## 2023-09-07 ENCOUNTER — NON-APPOINTMENT (OUTPATIENT)
Age: 63
End: 2023-09-07

## 2023-09-14 ENCOUNTER — NON-APPOINTMENT (OUTPATIENT)
Age: 63
End: 2023-09-14

## 2023-09-14 ENCOUNTER — OUTPATIENT (OUTPATIENT)
Dept: OUTPATIENT SERVICES | Facility: HOSPITAL | Age: 63
LOS: 1 days | Discharge: HOME | End: 2023-09-14
Payer: MEDICAID

## 2023-09-14 DIAGNOSIS — Z92.21 PERSONAL HISTORY OF ANTINEOPLASTIC CHEMOTHERAPY: Chronic | ICD-10-CM

## 2023-09-14 DIAGNOSIS — Z98.890 OTHER SPECIFIED POSTPROCEDURAL STATES: Chronic | ICD-10-CM

## 2023-09-14 PROCEDURE — 55876 PLACE RT DEVICE/MARKER PROS: CPT

## 2023-09-14 PROCEDURE — 55874 TPRNL PLMT BIODEGRDABL MATRL: CPT

## 2023-09-14 PROCEDURE — 76872 US TRANSRECTAL: CPT | Mod: 26

## 2023-09-14 RX ORDER — LORAZEPAM 0.5 MG/1
0.5 TABLET ORAL ONCE
Qty: 1 | Refills: 0 | Status: DISCONTINUED | OUTPATIENT
Start: 2023-09-14 | End: 2023-09-14

## 2023-09-15 ENCOUNTER — NON-APPOINTMENT (OUTPATIENT)
Age: 63
End: 2023-09-15

## 2023-09-20 PROCEDURE — 77263 THER RADIOLOGY TX PLNG CPLX: CPT

## 2023-09-25 DIAGNOSIS — C61 MALIGNANT NEOPLASM OF PROSTATE: ICD-10-CM

## 2023-10-01 PROBLEM — Z92.3 HISTORY OF RADIATION THERAPY: Status: RESOLVED | Noted: 2023-02-22 | Resolved: 2023-10-01

## 2023-10-10 PROCEDURE — 77301 RADIOTHERAPY DOSE PLAN IMRT: CPT | Mod: 26

## 2023-10-10 PROCEDURE — 77300 RADIATION THERAPY DOSE PLAN: CPT | Mod: 26

## 2023-10-10 PROCEDURE — 77338 DESIGN MLC DEVICE FOR IMRT: CPT | Mod: 26

## 2023-10-19 PROCEDURE — 77014: CPT | Mod: 26

## 2023-10-20 PROCEDURE — 77014: CPT | Mod: 26

## 2023-10-23 ENCOUNTER — NON-APPOINTMENT (OUTPATIENT)
Age: 63
End: 2023-10-23

## 2023-10-23 PROCEDURE — 77014: CPT | Mod: 26

## 2023-10-24 VITALS
WEIGHT: 139 LBS | DIASTOLIC BLOOD PRESSURE: 72 MMHG | SYSTOLIC BLOOD PRESSURE: 137 MMHG | HEART RATE: 94 BPM | OXYGEN SATURATION: 97 % | BODY MASS INDEX: 19.94 KG/M2 | RESPIRATION RATE: 16 BRPM

## 2023-10-24 VITALS
WEIGHT: 139 LBS | BODY MASS INDEX: 19.9 KG/M2 | RESPIRATION RATE: 16 BRPM | SYSTOLIC BLOOD PRESSURE: 137 MMHG | DIASTOLIC BLOOD PRESSURE: 72 MMHG | OXYGEN SATURATION: 97 % | HEIGHT: 70 IN | HEART RATE: 94 BPM

## 2023-10-24 PROCEDURE — G6002: CPT | Mod: 26

## 2023-10-25 PROCEDURE — 77427 RADIATION TX MANAGEMENT X5: CPT

## 2023-10-25 PROCEDURE — 77014: CPT | Mod: 26

## 2023-10-26 PROCEDURE — G6002: CPT | Mod: 26

## 2023-10-27 PROCEDURE — G6002: CPT | Mod: 26

## 2023-10-30 ENCOUNTER — NON-APPOINTMENT (OUTPATIENT)
Age: 63
End: 2023-10-30

## 2023-10-30 VITALS
WEIGHT: 315 LBS | RESPIRATION RATE: 16 BRPM | DIASTOLIC BLOOD PRESSURE: 79 MMHG | HEART RATE: 66 BPM | SYSTOLIC BLOOD PRESSURE: 148 MMHG | BODY MASS INDEX: 201.45 KG/M2

## 2023-10-30 PROCEDURE — G6002: CPT | Mod: 26

## 2023-10-31 PROCEDURE — G6002: CPT | Mod: 26

## 2023-11-01 PROCEDURE — 77014: CPT | Mod: 26

## 2023-11-01 PROCEDURE — 77427 RADIATION TX MANAGEMENT X5: CPT

## 2023-11-02 PROCEDURE — G6002: CPT | Mod: 26

## 2023-11-03 PROCEDURE — G6002: CPT | Mod: 26

## 2023-11-06 ENCOUNTER — NON-APPOINTMENT (OUTPATIENT)
Age: 63
End: 2023-11-06

## 2023-11-06 VITALS
HEART RATE: 83 BPM | OXYGEN SATURATION: 99 % | SYSTOLIC BLOOD PRESSURE: 136 MMHG | BODY MASS INDEX: 43.65 KG/M2 | RESPIRATION RATE: 16 BRPM | DIASTOLIC BLOOD PRESSURE: 76 MMHG | WEIGHT: 304.23 LBS | TEMPERATURE: 97.7 F

## 2023-11-06 PROCEDURE — G6002: CPT | Mod: 26

## 2023-11-07 PROCEDURE — G6002: CPT | Mod: 26

## 2023-11-08 PROCEDURE — 77014: CPT | Mod: 26

## 2023-11-08 PROCEDURE — 77427 RADIATION TX MANAGEMENT X5: CPT

## 2023-11-09 PROCEDURE — G6002: CPT | Mod: 26

## 2023-11-10 PROCEDURE — G6002: CPT | Mod: 26

## 2023-11-13 ENCOUNTER — NON-APPOINTMENT (OUTPATIENT)
Age: 63
End: 2023-11-13

## 2023-11-13 VITALS
WEIGHT: 139 LBS | DIASTOLIC BLOOD PRESSURE: 82 MMHG | SYSTOLIC BLOOD PRESSURE: 160 MMHG | HEART RATE: 96 BPM | BODY MASS INDEX: 19.94 KG/M2 | OXYGEN SATURATION: 98 % | RESPIRATION RATE: 16 BRPM

## 2023-11-13 PROCEDURE — G6002: CPT | Mod: 26

## 2023-11-14 PROCEDURE — G6002: CPT | Mod: 26

## 2023-11-15 PROCEDURE — 77427 RADIATION TX MANAGEMENT X5: CPT

## 2023-11-15 PROCEDURE — 77014: CPT | Mod: 26

## 2023-11-16 PROCEDURE — G6002: CPT | Mod: 26

## 2023-11-17 PROCEDURE — G6002: CPT | Mod: 26

## 2023-11-19 PROCEDURE — G6002: CPT | Mod: 26

## 2023-11-20 ENCOUNTER — NON-APPOINTMENT (OUTPATIENT)
Age: 63
End: 2023-11-20

## 2023-11-20 VITALS
RESPIRATION RATE: 16 BRPM | DIASTOLIC BLOOD PRESSURE: 77 MMHG | HEART RATE: 67 BPM | BODY MASS INDEX: 19.63 KG/M2 | SYSTOLIC BLOOD PRESSURE: 132 MMHG | WEIGHT: 136.8 LBS

## 2023-11-20 PROCEDURE — G6002: CPT | Mod: 26

## 2023-11-21 PROCEDURE — 77427 RADIATION TX MANAGEMENT X5: CPT

## 2023-11-21 PROCEDURE — G6002: CPT | Mod: 26

## 2023-11-22 PROCEDURE — 77014: CPT | Mod: 26

## 2023-12-20 ENCOUNTER — APPOINTMENT (OUTPATIENT)
Dept: RADIATION ONCOLOGY | Facility: CLINIC | Age: 63
End: 2023-12-20
Payer: MEDICAID

## 2023-12-20 VITALS
RESPIRATION RATE: 16 BRPM | BODY MASS INDEX: 19.94 KG/M2 | WEIGHT: 139 LBS | HEART RATE: 91 BPM | DIASTOLIC BLOOD PRESSURE: 85 MMHG | OXYGEN SATURATION: 99 % | SYSTOLIC BLOOD PRESSURE: 147 MMHG

## 2023-12-20 PROCEDURE — 99024 POSTOP FOLLOW-UP VISIT: CPT

## 2023-12-26 NOTE — DISEASE MANAGEMENT
[Clinical] : TNM Stage: c [FreeTextEntry4] : adenocarcinoma [TTNM] : 3b [NTNM] : 1 [MTNM] : 0 [IV] : IV [de-identified] : 7020 cGy [de-identified] : prostate/SV/nodes

## 2023-12-26 NOTE — HISTORY OF PRESENT ILLNESS
[FreeTextEntry1] : This 63-year-old male is diagnosed with very high risk prostate cancer, T3b G9(4+5) N1, and HIV on HAART. Mr. Loaiza completed radiation therapy on.  At last on-treatment visit: Reported nocturia 4-5X. Reported slightly weak stream. Reported regular bowel movements.   Today Mr. Whitehead note nocturia 4-5X continues, along with urinary frequency and intermittent urge incontinence.  Reports he takes 1 tamsulosin daily.  Advised to do a trial of 2 tablets daily for 1 month.   Reports formed daily bowel movements, and less incidence of constipation as he had prior to radiation therapy.

## 2023-12-26 NOTE — REVIEW OF SYSTEMS
[IPSS Score (0-40): ___] : IPSS score: [unfilled] [EPIC-CP Score (0-60): ___] : EPIC-CP score: [unfilled] [Negative] : Gastrointestinal [FreeTextEntry8] : radiation therapy for prostate cancer.

## 2024-01-02 ENCOUNTER — APPOINTMENT (OUTPATIENT)
Dept: RADIATION ONCOLOGY | Facility: CLINIC | Age: 64
End: 2024-01-02

## 2024-03-28 ENCOUNTER — APPOINTMENT (OUTPATIENT)
Dept: RADIATION ONCOLOGY | Facility: CLINIC | Age: 64
End: 2024-03-28
Payer: MEDICAID

## 2024-03-28 VITALS
SYSTOLIC BLOOD PRESSURE: 125 MMHG | HEIGHT: 70 IN | RESPIRATION RATE: 16 BRPM | BODY MASS INDEX: 20.19 KG/M2 | HEART RATE: 98 BPM | DIASTOLIC BLOOD PRESSURE: 79 MMHG | OXYGEN SATURATION: 99 % | WEIGHT: 141 LBS

## 2024-03-28 DIAGNOSIS — Z92.3 PERSONAL HISTORY OF IRRADIATION: ICD-10-CM

## 2024-03-28 DIAGNOSIS — C61 MALIGNANT NEOPLASM OF PROSTATE: ICD-10-CM

## 2024-03-28 PROCEDURE — 99213 OFFICE O/P EST LOW 20 MIN: CPT

## 2024-03-28 RX ORDER — TAMSULOSIN HYDROCHLORIDE 0.4 MG/1
0.4 CAPSULE ORAL
Qty: 60 | Refills: 6 | Status: ACTIVE | COMMUNITY
Start: 2023-12-26 | End: 1900-01-01

## 2024-04-01 PROBLEM — C61 ADENOCARCINOMA OF PROSTATE: Status: ACTIVE | Noted: 2023-02-22

## 2024-04-01 PROBLEM — Z92.3 PERSONAL HISTORY OF RADIATION THERAPY: Status: ACTIVE | Noted: 2024-04-01

## 2024-04-01 NOTE — DISEASE MANAGEMENT
[3] : T3 [b] : b [1] : N1 [10-20] : 10 - 20 ng/mL [0] : M0 [9] : Template Biopsy Yaquelin Score: 9 [IIIC] : IIIC

## 2024-04-01 NOTE — PHYSICAL EXAM
[Normal] : oriented to person, place and time, the affect was normal, the mood was normal and not anxious [FreeTextEntry1] : deferred [de-identified] : deferred

## 2024-04-01 NOTE — ASSESSMENT
[No evidence of disease] : No evidence of disease [FreeTextEntry1] : minimal treatment related sequelae, mostly nocturia X4.

## 2024-04-01 NOTE — VITALS
[Least Pain Intensity: 0/10] : 0/10 [Maximal Pain Intensity: 0/10] : 0/10 [80: Normal activity with effort; some signs or symptoms of disease.] : 80: Normal activity with effort; some signs or symptoms of disease.  [ECOG Performance Status: 1 - Restricted in physically strenuous activity but ambulatory and able to carry out work of a light or sedentary nature] : Performance Status: 1 - Restricted in physically strenuous activity but ambulatory and able to carry out work of a light or sedentary nature, e.g., light house work, office work [0 - No Distress] : Distress Level: 0

## 2024-04-01 NOTE — HISTORY OF PRESENT ILLNESS
[FreeTextEntry1] :  62M w/ very high risk prostate cancer, rcT3b G9(4+5) N1, and HIV on HAART. 9/26/22 biopsy of prostate showed up to G9(4+5) disease present in multiple cores with intraductal component on our review. PET CT 3/29/23 had shown avidity throughout prostate and seminal vesicles, as well as avid nodes in the pelvis and lower retroperitoneum; equivocal mediastina/hilar nodes.   PMH/PSH: parathyroidectomy, DM, lymphoma, HIV+, HTN/HLD, BPH. Of note, previously received radiation therapy and chemotherapy for NHL (Right neck) in 1992.  PSA trend: 9/2022 = 5.78 ng/ml 2/9/23 = 10.22 ng/ml  PET CT 3/29/23 had shown avidity throughout prostate and seminal vesicles, as well as avid nodes in the pelvis and retroperitoneum; equivocal mediastina/hilar nodes.  Started ADT 5/1/23 per Dr. Granados. Hasn't noticed any changes in urination, denies fatigue or hot flashes.

## 2024-09-26 ENCOUNTER — APPOINTMENT (OUTPATIENT)
Dept: RADIATION ONCOLOGY | Facility: CLINIC | Age: 64
End: 2024-09-26
Payer: MEDICAID

## 2024-09-26 VITALS
DIASTOLIC BLOOD PRESSURE: 74 MMHG | RESPIRATION RATE: 16 BRPM | WEIGHT: 145 LBS | HEIGHT: 70 IN | HEART RATE: 103 BPM | BODY MASS INDEX: 20.76 KG/M2 | OXYGEN SATURATION: 99 % | SYSTOLIC BLOOD PRESSURE: 136 MMHG

## 2024-09-26 DIAGNOSIS — C61 MALIGNANT NEOPLASM OF PROSTATE: ICD-10-CM

## 2024-09-26 DIAGNOSIS — Z92.3 PERSONAL HISTORY OF IRRADIATION: ICD-10-CM

## 2024-09-26 PROCEDURE — 99212 OFFICE O/P EST SF 10 MIN: CPT

## 2024-09-26 PROCEDURE — G2211 COMPLEX E/M VISIT ADD ON: CPT | Mod: NC

## 2024-09-26 NOTE — DISEASE MANAGEMENT
[3] : T3 [b] : b [1] : N1 [0] : M0 [10-20] : 10 - 20 ng/mL [9] : Template Biopsy Yaquelin Score: 9 [IIIC] : IIIC

## 2024-09-28 NOTE — ASSESSMENT
[No evidence of disease] : No evidence of disease [FreeTextEntry1] : minimal treatment related sequelae, mostly nocturia X4, and  diminished sexual function.

## 2024-09-28 NOTE — PHYSICAL EXAM
[Normal] : oriented to person, place and time, the affect was normal, the mood was normal and not anxious [FreeTextEntry1] : deferred [de-identified] : deferred

## 2024-09-28 NOTE — HISTORY OF PRESENT ILLNESS
[FreeTextEntry1] : 63-year-old Male w/ very high risk prostate cancer, rcT3b G9(4+5) N1, and HIV on HAART. 9/26/22 biopsy of prostate showed up to G9(4+5) disease present in multiple cores with intraductal component on our review. PET CT 3/29/23 had shown avidity throughout prostate and seminal vesicles, as well as avid nodes in the pelvis and lower retroperitoneum; equivocal mediastina/hilar nodes.   Mr. Schuster presents for routine follow-up.  He completed radiation therapy (7020 cGy) November 2023.  PMH/PSH: parathyroidectomy, DM, lymphoma, HIV+, HTN/HLD, BPH. Of note, previously received radiation therapy and chemotherapy for NHL (Right neck) in 1992.  Started ADT 5/1/23 per Dr. Granados. Currently on Lupron q3 mos  .

## 2024-09-28 NOTE — PHYSICAL EXAM
[Normal] : oriented to person, place and time, the affect was normal, the mood was normal and not anxious [FreeTextEntry1] : deferred [de-identified] : deferred

## 2024-09-28 NOTE — REVIEW OF SYSTEMS
[Negative] : Allergic/Immunologic [IPSS Score (0-40): ___] : IPSS score: [unfilled] [EPIC-CP Score (0-60): ___] : EPIC-CP score: [unfilled] [FreeTextEntry8] : Radiation therapy for prostate cancer

## 2024-09-28 NOTE — DATA REVIEWED
[No studies available for review at this time.] : No studies available for review at this time. [FreeTextEntry1] : PSA  9/12/24  <0.04 ng/ml

## 2025-04-22 NOTE — REASON FOR VISIT
Detail Level: Detailed [Re-evaluation] : re-evaluation for prostate cancer [Other: _________] : [unfilled] [Other: _____] : [unfilled] Detail Level: Zone